# Patient Record
Sex: MALE | Race: WHITE | NOT HISPANIC OR LATINO | URBAN - METROPOLITAN AREA
[De-identification: names, ages, dates, MRNs, and addresses within clinical notes are randomized per-mention and may not be internally consistent; named-entity substitution may affect disease eponyms.]

---

## 2023-09-21 ENCOUNTER — OUTPATIENT (OUTPATIENT)
Dept: OUTPATIENT SERVICES | Facility: HOSPITAL | Age: 68
LOS: 1 days | End: 2023-09-21
Payer: MEDICARE

## 2023-09-21 VITALS
OXYGEN SATURATION: 95 % | WEIGHT: 161.82 LBS | SYSTOLIC BLOOD PRESSURE: 134 MMHG | RESPIRATION RATE: 18 BRPM | HEART RATE: 80 BPM | DIASTOLIC BLOOD PRESSURE: 86 MMHG | HEIGHT: 67 IN | TEMPERATURE: 98 F

## 2023-09-21 VITALS
DIASTOLIC BLOOD PRESSURE: 79 MMHG | OXYGEN SATURATION: 96 % | SYSTOLIC BLOOD PRESSURE: 115 MMHG | RESPIRATION RATE: 17 BRPM | HEART RATE: 82 BPM

## 2023-09-21 DIAGNOSIS — H33.012 RETINAL DETACHMENT WITH SINGLE BREAK, LEFT EYE: ICD-10-CM

## 2023-09-21 PROCEDURE — C1784: CPT

## 2023-09-21 PROCEDURE — 67108 REPAIR DETACHED RETINA: CPT | Mod: AS,LT

## 2023-09-21 PROCEDURE — 67108 REPAIR DETACHED RETINA: CPT | Mod: LT

## 2023-09-21 PROCEDURE — C1889: CPT

## 2023-09-21 DEVICE — GS C3F8 PERFLUOROPROPANE IOL 2.5 L 20GM: Type: IMPLANTABLE DEVICE | Site: LEFT | Status: FUNCTIONAL

## 2023-09-21 DEVICE — STRIP SILICONE STYLE 42: Type: IMPLANTABLE DEVICE | Site: LEFT | Status: FUNCTIONAL

## 2023-09-21 DEVICE — PERFLUORON 7ML KIT: Type: IMPLANTABLE DEVICE | Site: LEFT | Status: FUNCTIONAL

## 2023-09-21 DEVICE — SLEEVE OVAL STYLE S3083: Type: IMPLANTABLE DEVICE | Site: LEFT | Status: FUNCTIONAL

## 2023-09-21 DEVICE — LASER PROBE 25G CONSTELLATION: Type: IMPLANTABLE DEVICE | Site: LEFT | Status: FUNCTIONAL

## 2023-09-21 NOTE — ASU PATIENT PROFILE, ADULT - FALL HARM RISK - HARM RISK INTERVENTIONS

## 2023-09-21 NOTE — ASU DISCHARGE PLAN (ADULT/PEDIATRIC) - NS MD DC FALL RISK RISK
For information on Fall & Injury Prevention, visit: [email or web address] OR  [email or web address] OR  https://www.cdc.gov/steadi/patient.html

## 2023-09-21 NOTE — ASU PATIENT PROFILE, ADULT - VISION (WITH CORRECTIVE LENSES IF THE PATIENT USUALLY WEARS THEM):
Left eye is blurry/Partially impaired: cannot see medication labels or newsprint, but can see obstacles in path, and the surrounding layout; can count fingers at arm's length

## 2023-09-21 NOTE — ASU DISCHARGE PLAN (ADULT/PEDIATRIC) - CARE PROVIDER_API CALL
Ab Mendenhall  Ophthalmology  [address] [place] [address], [address]  [place] [address]  Phone: [phone]  Fax: [phone]  Scheduled Appointment: 09/22/2023 09:30 AM

## 2023-09-21 NOTE — ASU PREOP CHECKLIST - TO WHOM
Danny Weber is a 21 year old male here alone presenting with:    Symptoms: cough, headache, dry throat, rhinorrhea, sharp chest pains, fever (subjective-last 48 hours), occasional SOB    Symptoms present for:  4 days    Denies: hx of asthma    OTC medications: tylenol, ibuprofen, nyquil, benadryl    Recent ABX use: none past 90 days    Work note needed: yes    Contacted PCP: no    Received COVID vaccine: no        Rooming documentation of social history includes tobacco screening only.               Barbara ALFONSO RN Christen Nielsen RN

## (undated) DEVICE — DRAPE 1/2 SHEET 40X57"

## (undated) DEVICE — WARMING BLANKET LOWER ADULT

## (undated) DEVICE — DRAPE STERI-DRAPE INCISE 23X17"

## (undated) DEVICE — DRAPE MICROSCOPE RESIGHT

## (undated) DEVICE — GLV 7 PROTEXIS (WHITE)

## (undated) DEVICE — ELCTR BIPOLAR CORD J&J 12FT DISP

## (undated) DEVICE — AUTO GAS FILL CONSTELLATION

## (undated) DEVICE — SOL IRR BAL SALT + 500ML

## (undated) DEVICE — VENODYNE/SCD SLEEVE CALF MEDIUM

## (undated) DEVICE — SUT VICRYL 7-0 12" TG140-8 DA

## (undated) DEVICE — DIATHERMY PROBE 25GA

## (undated) DEVICE — SUT ETHILON 5-0 18" RD-1

## (undated) DEVICE — PACK CONSTELLATION POST 25G 20K

## (undated) DEVICE — SOL BALANCE SALT 15ML

## (undated) DEVICE — SUT SILK 2-0 12-18"

## (undated) DEVICE — PACK VITRECTOMY

## (undated) DEVICE — NDL HYPO SAFE 22G X 1.5" (BLACK)

## (undated) DEVICE — CANNULA SURGICAL SPECIALTIES DUAL BORE 25G